# Patient Record
Sex: FEMALE | Race: OTHER | URBAN - METROPOLITAN AREA
[De-identification: names, ages, dates, MRNs, and addresses within clinical notes are randomized per-mention and may not be internally consistent; named-entity substitution may affect disease eponyms.]

---

## 2017-08-01 ENCOUNTER — EMERGENCY (EMERGENCY)
Facility: HOSPITAL | Age: 67
LOS: 1 days | Discharge: HOME | End: 2017-08-01
Attending: EMERGENCY MEDICINE | Admitting: EMERGENCY MEDICINE
Payer: COMMERCIAL

## 2017-08-01 VITALS
DIASTOLIC BLOOD PRESSURE: 78 MMHG | SYSTOLIC BLOOD PRESSURE: 108 MMHG | HEART RATE: 96 BPM | RESPIRATION RATE: 16 BRPM | OXYGEN SATURATION: 99 % | TEMPERATURE: 99 F

## 2017-08-01 VITALS
DIASTOLIC BLOOD PRESSURE: 63 MMHG | RESPIRATION RATE: 16 BRPM | TEMPERATURE: 100 F | HEIGHT: 65 IN | HEART RATE: 92 BPM | OXYGEN SATURATION: 99 % | WEIGHT: 110.01 LBS | SYSTOLIC BLOOD PRESSURE: 113 MMHG

## 2017-08-01 DIAGNOSIS — Z87.891 PERSONAL HISTORY OF NICOTINE DEPENDENCE: ICD-10-CM

## 2017-08-01 DIAGNOSIS — N28.9 DISORDER OF KIDNEY AND URETER, UNSPECIFIED: ICD-10-CM

## 2017-08-01 DIAGNOSIS — R13.10 DYSPHAGIA, UNSPECIFIED: ICD-10-CM

## 2017-08-01 DIAGNOSIS — I10 ESSENTIAL (PRIMARY) HYPERTENSION: ICD-10-CM

## 2017-08-01 DIAGNOSIS — D64.9 ANEMIA, UNSPECIFIED: ICD-10-CM

## 2017-08-01 LAB
ALBUMIN SERPL ELPH-MCNC: 3.5 G/DL — SIGNIFICANT CHANGE UP (ref 3.3–5)
ALP SERPL-CCNC: 71 U/L — SIGNIFICANT CHANGE UP (ref 40–120)
ALT FLD-CCNC: 5 U/L — LOW (ref 10–45)
ANION GAP SERPL CALC-SCNC: 12 MMOL/L — SIGNIFICANT CHANGE UP (ref 5–17)
APTT BLD: 24.8 SEC — LOW (ref 27.5–37.4)
AST SERPL-CCNC: 20 U/L — SIGNIFICANT CHANGE UP (ref 10–40)
BASOPHILS NFR BLD AUTO: 0.7 % — SIGNIFICANT CHANGE UP (ref 0–2)
BILIRUB SERPL-MCNC: 0.3 MG/DL — SIGNIFICANT CHANGE UP (ref 0.2–1.2)
BUN SERPL-MCNC: 14 MG/DL — SIGNIFICANT CHANGE UP (ref 7–23)
CALCIUM SERPL-MCNC: 9.5 MG/DL — SIGNIFICANT CHANGE UP (ref 8.4–10.5)
CHLORIDE SERPL-SCNC: 101 MMOL/L — SIGNIFICANT CHANGE UP (ref 96–108)
CK MB CFR SERPL CALC: 1.3 NG/ML — SIGNIFICANT CHANGE UP (ref 0–6.7)
CK SERPL-CCNC: 50 U/L — SIGNIFICANT CHANGE UP (ref 25–170)
CO2 SERPL-SCNC: 25 MMOL/L — SIGNIFICANT CHANGE UP (ref 22–31)
CREAT SERPL-MCNC: 1.7 MG/DL — HIGH (ref 0.5–1.3)
EOSINOPHIL NFR BLD AUTO: 2.4 % — SIGNIFICANT CHANGE UP (ref 0–6)
GLUCOSE SERPL-MCNC: 96 MG/DL — SIGNIFICANT CHANGE UP (ref 70–99)
HCT VFR BLD CALC: 28.1 % — LOW (ref 34.5–45)
HGB BLD-MCNC: 8.6 G/DL — LOW (ref 11.5–15.5)
INR BLD: 1 — SIGNIFICANT CHANGE UP (ref 0.88–1.16)
LYMPHOCYTES # BLD AUTO: 14.9 % — SIGNIFICANT CHANGE UP (ref 13–44)
MCHC RBC-ENTMCNC: 27.7 PG — SIGNIFICANT CHANGE UP (ref 27–34)
MCHC RBC-ENTMCNC: 30.6 G/DL — LOW (ref 32–36)
MCV RBC AUTO: 90.4 FL — SIGNIFICANT CHANGE UP (ref 80–100)
MONOCYTES NFR BLD AUTO: 10.8 % — SIGNIFICANT CHANGE UP (ref 2–14)
NEUTROPHILS NFR BLD AUTO: 71.2 % — SIGNIFICANT CHANGE UP (ref 43–77)
PLATELET # BLD AUTO: 318 K/UL — SIGNIFICANT CHANGE UP (ref 150–400)
POTASSIUM SERPL-MCNC: 3.9 MMOL/L — SIGNIFICANT CHANGE UP (ref 3.5–5.3)
POTASSIUM SERPL-SCNC: 3.9 MMOL/L — SIGNIFICANT CHANGE UP (ref 3.5–5.3)
PROT SERPL-MCNC: 6.7 G/DL — SIGNIFICANT CHANGE UP (ref 6–8.3)
PROTHROM AB SERPL-ACNC: 11.1 SEC — SIGNIFICANT CHANGE UP (ref 9.8–12.7)
RBC # BLD: 3.11 M/UL — LOW (ref 3.8–5.2)
RBC # FLD: 13.4 % — SIGNIFICANT CHANGE UP (ref 10.3–16.9)
SODIUM SERPL-SCNC: 138 MMOL/L — SIGNIFICANT CHANGE UP (ref 135–145)
TROPONIN T SERPL-MCNC: <0.01 NG/ML — SIGNIFICANT CHANGE UP (ref 0–0.01)
WBC # BLD: 3 K/UL — LOW (ref 3.8–10.5)
WBC # FLD AUTO: 3 K/UL — LOW (ref 3.8–10.5)

## 2017-08-01 PROCEDURE — 93010 ELECTROCARDIOGRAM REPORT: CPT

## 2017-08-01 PROCEDURE — 82550 ASSAY OF CK (CPK): CPT

## 2017-08-01 PROCEDURE — 80053 COMPREHEN METABOLIC PANEL: CPT

## 2017-08-01 PROCEDURE — 84484 ASSAY OF TROPONIN QUANT: CPT

## 2017-08-01 PROCEDURE — 71020: CPT | Mod: 26

## 2017-08-01 PROCEDURE — 85730 THROMBOPLASTIN TIME PARTIAL: CPT

## 2017-08-01 PROCEDURE — 85610 PROTHROMBIN TIME: CPT

## 2017-08-01 PROCEDURE — 99283 EMERGENCY DEPT VISIT LOW MDM: CPT | Mod: 25

## 2017-08-01 PROCEDURE — 85025 COMPLETE CBC W/AUTO DIFF WBC: CPT

## 2017-08-01 PROCEDURE — 82553 CREATINE MB FRACTION: CPT

## 2017-08-01 PROCEDURE — 36415 COLL VENOUS BLD VENIPUNCTURE: CPT

## 2017-08-01 PROCEDURE — 93005 ELECTROCARDIOGRAM TRACING: CPT

## 2017-08-01 PROCEDURE — 71046 X-RAY EXAM CHEST 2 VIEWS: CPT

## 2017-08-01 PROCEDURE — 99285 EMERGENCY DEPT VISIT HI MDM: CPT | Mod: 25

## 2017-08-01 RX ORDER — LISINOPRIL 2.5 MG/1
0 TABLET ORAL
Qty: 0 | Refills: 0 | COMMUNITY

## 2017-08-01 NOTE — ED PROVIDER NOTE - OBJECTIVE STATEMENT
66 yo F w/ h/o HTN and anemia, p/w difficulty swallowing  x 2 months. Pt describes her swallowing as painful and going down slowly, w/ both liquids and solids. Pt notes losing 52 pounds in 3 months despite no change in diet. Pt reports occasionally feeling depression and loneliness at home. Denies any vomiting, abdominal pain, melena, or blooded stool. 66 yo F w/ h/o HTN and anemia, p/w difficulty swallowing  x 2 months. Pt describes her swallowing as painful and going down slowly, w/ both liquids and solids. Pt notes losing 52 pounds in 3 months despite no change in diet. Pt reports occasionally feeling depression and loneliness at home. Denies any focal weakness, numbness, tingling, gait changes, speech changes,  vomiting, abdominal pain, melena, or blooded stool.

## 2017-08-01 NOTE — ED PROVIDER NOTE - CARE PLAN
Principal Discharge DX:	Dysphagia, unspecified type  Secondary Diagnosis:	Renal insufficiency  Secondary Diagnosis:	Anemia, unspecified type

## 2017-08-01 NOTE — ED PROVIDER NOTE - ENMT, MLM
Airway patent, Nasal mucosa clear. Mouth with normal mucosa. Throat has no vesicles, no oropharyngeal exudates and uvula is midline. normal thyroid, normal posterior pharynx w/o legions or masses. tolerating secretions, no stridor, no cervical lymphadenopathy or neck masses.

## 2017-08-01 NOTE — ED ADULT NURSE NOTE - OBJECTIVE STATEMENT
Patient presents to the ED with daughter at bedside, complaining of pain in the midsternal area after swallowing food and water, patient reports that this has been going on for three months. Denies any problems with respirations. Reports pain to be as if someone punched her in the chest. Patient reports increased weight loss in the past 2-3 months, with loss stool. Has been eating normally. Denies any abdominal pain. Bilateral ulcer to the lower legs from vericose veins, getting wound care outpatient. No fever or chills. Lungs sounds clear, equal rise of chest. Hx of HTN.

## 2017-08-01 NOTE — ED PROVIDER NOTE - MEDICAL DECISION MAKING DETAILS
68 yo F, former light smoker, occasional drinker, p/w several months of dysphagia to both solids and liquids in addition to unintentional weight loss. Exam unremarkable. Differential includes malignancy, motility disorder. Do not suspect cardiac cause or stroke. Plan: basic labs to assess nutrition/hydration status, chest xray, and ultimately refer for out pt GI workup.

## 2018-02-09 ENCOUNTER — APPOINTMENT (OUTPATIENT)
Dept: INTERNAL MEDICINE | Facility: CLINIC | Age: 68
End: 2018-02-09

## 2020-02-04 ENCOUNTER — APPOINTMENT (OUTPATIENT)
Dept: NEUROLOGY | Facility: CLINIC | Age: 70
End: 2020-02-04
Payer: MEDICAID

## 2020-02-04 VITALS
DIASTOLIC BLOOD PRESSURE: 82 MMHG | WEIGHT: 136 LBS | TEMPERATURE: 98.6 F | BODY MASS INDEX: 25.03 KG/M2 | HEART RATE: 72 BPM | HEIGHT: 62 IN | SYSTOLIC BLOOD PRESSURE: 148 MMHG | OXYGEN SATURATION: 98 %

## 2020-02-04 DIAGNOSIS — F03.90 UNSPECIFIED DEMENTIA W/OUT BEHAVIORAL DISTURBANCE: ICD-10-CM

## 2020-02-04 DIAGNOSIS — R41.3 OTHER AMNESIA: ICD-10-CM

## 2020-02-04 PROBLEM — I10 ESSENTIAL (PRIMARY) HYPERTENSION: Chronic | Status: ACTIVE | Noted: 2017-08-01

## 2020-02-04 PROCEDURE — 99204 OFFICE O/P NEW MOD 45 MIN: CPT

## 2020-02-04 RX ORDER — APIXABAN 5 MG/1
5 TABLET, FILM COATED ORAL
Refills: 0 | Status: ACTIVE | COMMUNITY

## 2020-02-04 RX ORDER — METOPROLOL SUCCINATE 50 MG/1
50 TABLET, EXTENDED RELEASE ORAL DAILY
Refills: 0 | Status: ACTIVE | COMMUNITY

## 2020-02-04 RX ORDER — LISINOPRIL 20 MG/1
20 TABLET ORAL DAILY
Refills: 0 | Status: ACTIVE | COMMUNITY

## 2020-02-04 NOTE — HISTORY OF PRESENT ILLNESS
[FreeTextEntry1] : CC: memory loss\par \par HPI: 69 year old woman accompanied by her daughter and grandaughter here for evaluation of memory impairment. \par Pt states it started 1.5 years ago, but daughter states it has been longer than that - maybe 2 years or more. \par She repeats questions, doesn't remember what happened recently, forgets where they are going. \par Daughter also states pt was messing up her bills, giving away money etc and so she brought her to live with her a few weeks ago. \par She has a history of atrial fibrillation and hypertension, on eliquis. \par She has not had any blood work or brain imaging for this problem. \par \par ROS: 13 pt review of systems performed and reviewed with patient (General, Eyes, Ears, Cardiovascular, Respiratory, Gastrointestinal, Genitourinary, Musculoskeletal, Skin, Endocrine, Hematologic, Psychiatric, Neurologic)\par Past medical history, surgical history, social history, and family history reviewed with patient\par See scanned document for details

## 2020-02-04 NOTE — ASSESSMENT
[FreeTextEntry1] : She most likely has Alzheimer's type dementia\par Will get CT head to r/o vascular dementia given history of Afib\par Also check B12, folate, RPR, TSH\par Refer for neuropsych testing\par Return after that, will discuss pharmacological options for symptomatic treatment.

## 2020-02-04 NOTE — PHYSICAL EXAM
[FreeTextEntry1] : Gen: appears well, well-nourished, no acute distress\par \par MS: awake, alert, oriented, speech fluent, comprehension intact, good fund of knowledge, recent and remote memory intact, attention intact\par \par MMSE 17/30 (with serial 7s), 18/30 (with WORLD backwards)\par \par CN: PERRL, EOMI, visual fields full, facial strength and sensation intact and symmetric, palate elevation symmetric, tongue midline, no tongue atrophy or fasciculations\par \par Motor: normal bulk and tone, 5/5 strength throughout, no abnormal movements\par \par Sensory: light touch intact and symmetric throughout\par \par Reflexes: 1+ symmetric throughout\par \par Coordination: no dysmetria on finger to nose, Romberg negative\par \par Gait: narrow base, very mildly unstable when turning, no shuffling or freezing \par

## 2020-02-05 LAB
FOLATE SERPL-MCNC: 9 NG/ML
T PALLIDUM AB SER QL IA: NEGATIVE
TSH SERPL-ACNC: 1.13 UIU/ML
VIT B12 SERPL-MCNC: 841 PG/ML

## 2020-02-12 LAB
HOMOCYSTEINE LEVEL: 16.1 UMOL/L
METHYLMALONIC ACID LEVEL: 220 NMOL/L

## 2025-05-06 ENCOUNTER — RX ONLY (RX ONLY)
Age: 75
End: 2025-05-06

## 2025-05-06 ENCOUNTER — OFFICE (OUTPATIENT)
Facility: LOCATION | Age: 75
Setting detail: OPHTHALMOLOGY
End: 2025-05-06
Payer: COMMERCIAL

## 2025-05-06 DIAGNOSIS — H16.221: ICD-10-CM

## 2025-05-06 DIAGNOSIS — H40.51X3: ICD-10-CM

## 2025-05-06 PROBLEM — H40.051 OCULAR HYPERTENSION; RIGHT EYE: Status: ACTIVE | Noted: 2025-05-06

## 2025-05-06 PROCEDURE — 92004 COMPRE OPH EXAM NEW PT 1/>: CPT | Performed by: OPHTHALMOLOGY

## 2025-05-06 PROCEDURE — 92020 GONIOSCOPY: CPT | Performed by: OPHTHALMOLOGY

## 2025-05-06 PROCEDURE — 92250 FUNDUS PHOTOGRAPHY W/I&R: CPT | Performed by: OPHTHALMOLOGY

## 2025-05-06 RX ORDER — BRIMONIDINE TARTRATE 2 MG/ML
SOLUTION OPHTHALMIC
Qty: 3 | Refills: 3 | Status: ACTIVE | OUTPATIENT

## 2025-05-06 RX ORDER — LATANOPROST 50 UG/ML
SOLUTION OPHTHALMIC
Qty: 3 | Refills: 3 | Status: ACTIVE | OUTPATIENT

## 2025-05-06 RX ORDER — ACETAZOLAMIDE 500 MG/1
CAPSULE, EXTENDED RELEASE ORAL
Qty: 28 | Refills: 0 | Status: ACTIVE | OUTPATIENT

## 2025-05-06 RX ORDER — SODIUM CHLORIDE 50 MG/ML
SOLUTION OPHTHALMIC
Qty: 1 | Refills: 0 | Status: ACTIVE | OUTPATIENT

## 2025-05-06 ASSESSMENT — KERATOMETRY
OD_K1POWER_DIOPTERS: 45.25
OD_AXISANGLE_DEGREES: 100
OS_K2POWER_DIOPTERS: 44.75
OS_K1POWER_DIOPTERS: 46.50
OD_K2POWER_DIOPTERS: 47.25
OS_AXISANGLE_DEGREES: 100

## 2025-05-06 ASSESSMENT — REFRACTION_CURRENTRX
OD_AXIS: 018
OD_SPHERE: -4.00
OS_OVR_VA: 20/
OD_ADD: +2.50
OD_CYLINDER: -1.50
OS_AXIS: 100
OS_CYLINDER: -1.25
OS_ADD: +2.00
OS_SPHERE: -4.50
OD_OVR_VA: 20/

## 2025-05-06 ASSESSMENT — CORNEAL EDEMA CLINICAL DESCRIPTION: OD_CORNEALEDEMA: 2+

## 2025-05-06 ASSESSMENT — CONFRONTATIONAL VISUAL FIELD TEST (CVF)
OD_FINDINGS: CONSTRICTION
OS_FINDINGS: FULL

## 2025-05-06 ASSESSMENT — VISUAL ACUITY
OS_BCVA: 20/NLP
OD_BCVA: 20/125-1

## 2025-05-06 ASSESSMENT — SUPERFICIAL PUNCTATE KERATITIS (SPK): OD_SPK: 2+

## 2025-05-06 ASSESSMENT — TONOMETRY: OS_IOP_MMHG: 18

## 2025-05-06 ASSESSMENT — TEAR BREAK UP TIME (TBUT)
OS_TBUT: 1+
OD_TBUT: 1+

## 2025-05-11 ENCOUNTER — EMERGENCY (EMERGENCY)
Facility: HOSPITAL | Age: 75
LOS: 1 days | End: 2025-05-11
Attending: STUDENT IN AN ORGANIZED HEALTH CARE EDUCATION/TRAINING PROGRAM | Admitting: STUDENT IN AN ORGANIZED HEALTH CARE EDUCATION/TRAINING PROGRAM
Payer: COMMERCIAL

## 2025-05-11 VITALS
TEMPERATURE: 98 F | DIASTOLIC BLOOD PRESSURE: 69 MMHG | RESPIRATION RATE: 18 BRPM | HEART RATE: 62 BPM | SYSTOLIC BLOOD PRESSURE: 141 MMHG | OXYGEN SATURATION: 97 %

## 2025-05-11 VITALS
HEART RATE: 84 BPM | RESPIRATION RATE: 18 BRPM | OXYGEN SATURATION: 99 % | TEMPERATURE: 99 F | DIASTOLIC BLOOD PRESSURE: 90 MMHG | SYSTOLIC BLOOD PRESSURE: 135 MMHG | WEIGHT: 123.02 LBS

## 2025-05-11 LAB
ANION GAP SERPL CALC-SCNC: 10 MMOL/L — SIGNIFICANT CHANGE UP (ref 5–17)
ANION GAP SERPL CALC-SCNC: 11 MMOL/L — SIGNIFICANT CHANGE UP (ref 5–17)
BASOPHILS # BLD AUTO: 0.04 K/UL — SIGNIFICANT CHANGE UP (ref 0–0.2)
BASOPHILS NFR BLD AUTO: 1.3 % — SIGNIFICANT CHANGE UP (ref 0–2)
BUN SERPL-MCNC: 16 MG/DL — SIGNIFICANT CHANGE UP (ref 7–23)
BUN SERPL-MCNC: 17 MG/DL — SIGNIFICANT CHANGE UP (ref 7–23)
CALCIUM SERPL-MCNC: 10 MG/DL — SIGNIFICANT CHANGE UP (ref 8.4–10.5)
CALCIUM SERPL-MCNC: 10.2 MG/DL — SIGNIFICANT CHANGE UP (ref 8.4–10.5)
CHLORIDE SERPL-SCNC: 109 MMOL/L — HIGH (ref 96–108)
CHLORIDE SERPL-SCNC: 110 MMOL/L — HIGH (ref 96–108)
CO2 SERPL-SCNC: 18 MMOL/L — LOW (ref 22–31)
CO2 SERPL-SCNC: 19 MMOL/L — LOW (ref 22–31)
CREAT SERPL-MCNC: 0.9 MG/DL — SIGNIFICANT CHANGE UP (ref 0.5–1.3)
CREAT SERPL-MCNC: 0.95 MG/DL — SIGNIFICANT CHANGE UP (ref 0.5–1.3)
EGFR: 62 ML/MIN/1.73M2 — SIGNIFICANT CHANGE UP
EGFR: 62 ML/MIN/1.73M2 — SIGNIFICANT CHANGE UP
EGFR: 66 ML/MIN/1.73M2 — SIGNIFICANT CHANGE UP
EGFR: 66 ML/MIN/1.73M2 — SIGNIFICANT CHANGE UP
EOSINOPHIL # BLD AUTO: 0.15 K/UL — SIGNIFICANT CHANGE UP (ref 0–0.5)
EOSINOPHIL NFR BLD AUTO: 5 % — SIGNIFICANT CHANGE UP (ref 0–6)
GLUCOSE SERPL-MCNC: 109 MG/DL — HIGH (ref 70–99)
GLUCOSE SERPL-MCNC: 114 MG/DL — HIGH (ref 70–99)
HCT VFR BLD CALC: 30 % — LOW (ref 34.5–45)
HGB BLD-MCNC: 8.3 G/DL — LOW (ref 11.5–15.5)
IMM GRANULOCYTES NFR BLD AUTO: 0 % — SIGNIFICANT CHANGE UP (ref 0–0.9)
LACTATE SERPL-SCNC: 1.6 MMOL/L — SIGNIFICANT CHANGE UP (ref 0.5–2)
LYMPHOCYTES # BLD AUTO: 0.62 K/UL — LOW (ref 1–3.3)
LYMPHOCYTES # BLD AUTO: 20.5 % — SIGNIFICANT CHANGE UP (ref 13–44)
MCHC RBC-ENTMCNC: 22.7 PG — LOW (ref 27–34)
MCHC RBC-ENTMCNC: 27.7 G/DL — LOW (ref 32–36)
MCV RBC AUTO: 82.2 FL — SIGNIFICANT CHANGE UP (ref 80–100)
MONOCYTES # BLD AUTO: 0.31 K/UL — SIGNIFICANT CHANGE UP (ref 0–0.9)
MONOCYTES NFR BLD AUTO: 10.3 % — SIGNIFICANT CHANGE UP (ref 2–14)
NEUTROPHILS # BLD AUTO: 1.9 K/UL — SIGNIFICANT CHANGE UP (ref 1.8–7.4)
NEUTROPHILS NFR BLD AUTO: 62.9 % — SIGNIFICANT CHANGE UP (ref 43–77)
NRBC BLD AUTO-RTO: 0 /100 WBCS — SIGNIFICANT CHANGE UP (ref 0–0)
PLATELET # BLD AUTO: 133 K/UL — LOW (ref 150–400)
POTASSIUM SERPL-MCNC: 3.7 MMOL/L — SIGNIFICANT CHANGE UP (ref 3.5–5.3)
POTASSIUM SERPL-MCNC: SIGNIFICANT CHANGE UP (ref 3.5–5.3)
POTASSIUM SERPL-SCNC: 3.7 MMOL/L — SIGNIFICANT CHANGE UP (ref 3.5–5.3)
POTASSIUM SERPL-SCNC: SIGNIFICANT CHANGE UP (ref 3.5–5.3)
RBC # BLD: 3.65 M/UL — LOW (ref 3.8–5.2)
RBC # FLD: 17 % — HIGH (ref 10.3–14.5)
SODIUM SERPL-SCNC: 138 MMOL/L — SIGNIFICANT CHANGE UP (ref 135–145)
SODIUM SERPL-SCNC: 139 MMOL/L — SIGNIFICANT CHANGE UP (ref 135–145)
WBC # BLD: 3.02 K/UL — LOW (ref 3.8–10.5)
WBC # FLD AUTO: 3.02 K/UL — LOW (ref 3.8–10.5)

## 2025-05-11 PROCEDURE — 96374 THER/PROPH/DIAG INJ IV PUSH: CPT

## 2025-05-11 PROCEDURE — 83605 ASSAY OF LACTIC ACID: CPT

## 2025-05-11 PROCEDURE — 87040 BLOOD CULTURE FOR BACTERIA: CPT

## 2025-05-11 PROCEDURE — 99284 EMERGENCY DEPT VISIT MOD MDM: CPT | Mod: 25

## 2025-05-11 PROCEDURE — 80048 BASIC METABOLIC PNL TOTAL CA: CPT

## 2025-05-11 PROCEDURE — 36415 COLL VENOUS BLD VENIPUNCTURE: CPT

## 2025-05-11 PROCEDURE — 99285 EMERGENCY DEPT VISIT HI MDM: CPT

## 2025-05-11 PROCEDURE — 85025 COMPLETE CBC W/AUTO DIFF WBC: CPT

## 2025-05-11 RX ORDER — CEFPODOXIME PROXETIL 200 MG/1
1 TABLET, FILM COATED ORAL
Qty: 14 | Refills: 0
Start: 2025-05-11 | End: 2025-05-17

## 2025-05-11 RX ORDER — CEFTRIAXONE 500 MG/1
1000 INJECTION, POWDER, FOR SOLUTION INTRAMUSCULAR; INTRAVENOUS ONCE
Refills: 0 | Status: COMPLETED | OUTPATIENT
Start: 2025-05-11 | End: 2025-05-11

## 2025-05-11 RX ADMIN — Medication 1000 MILLILITER(S): at 17:57

## 2025-05-11 RX ADMIN — CEFTRIAXONE 100 MILLIGRAM(S): 500 INJECTION, POWDER, FOR SOLUTION INTRAMUSCULAR; INTRAVENOUS at 17:56

## 2025-05-11 NOTE — ED ADULT TRIAGE NOTE - CHIEF COMPLAINT QUOTE
+ confirmed UTI yesterday, declined admission at OSH  Daughter wants to review the results of a CT head that results post dc home  Baseline AMS with vascular dementia, htn, afib on eliquis

## 2025-05-11 NOTE — ED PROVIDER NOTE - ATTENDING APP SHARED VISIT CONTRIBUTION OF CARE
75 year old F presenting with confusion, generalized weakness, low grade fever. Was seen at OSH, diagnosed with UTI, family declined admission, here with worsening symptoms. Will check labs, ua, admit. 75 year old F presenting with confusion, generalized weakness, low grade fever. Was seen at OSH, diagnosed with UTI, family declined admission, here with worsening symptoms. Will check labs, ua, ?admit vs dc.

## 2025-05-11 NOTE — ED ADULT NURSE NOTE - OBJECTIVE STATEMENT
75 F / hx of [facility] , presents to ED with her daughter c/o   increased gen fatigue . Per daughter was seen at other Hospital yesterday and diagnosed for UTI but  daughter signed her out , refusing admission . Patient is  on Amoxycillin  po that was started this morning . Patient is on baseline mental status . Afebrile in triage .

## 2025-05-11 NOTE — ED PROVIDER NOTE - CLINICAL SUMMARY MEDICAL DECISION MAKING FREE TEXT BOX
75 F pmh dementia, htn, afib on AC here with daughter for generalized weakness/fatigue x one week.  seen at OSH yesterday- had labs, ct head and diagnosed with UTI; family declined admission, dc w/ amox.  on exam vss, afebrile, 99.8 rectal temp, normotensive, fatigued appearing but in nad, mmm, lungs ctab, hrrr, abd soft/nt, no cvat, moving all ext, a/ox2 (baseline, states 1950).  results from yesterday reviewed; chronic mild hydrocephalus on CT, no acute pathology.  labs grossly unremarkable and UA + nitrite/LE and wbcs- cult was sent.  suspect sxs 2/2 uti.  will check labs/urine and give ceftriaxone/ivf.      labs reassuring, neg lactate/no leukocytosis.  hemolyzed K but normal Cr.  did not provide UA here but + uti from OSH rst w/ cult sent.  offered admission but pt/daughter would prefer to go home and trial PO abx.  daughter was most c/f CT results but feels comfortable taking mother  home if just uti.  will change to cefpodoxime and discussed strict return parameters

## 2025-05-11 NOTE — ED PROVIDER NOTE - PATIENT PORTAL LINK FT
You can access the FollowMyHealth Patient Portal offered by [facility] by registering at the following website: [email or web address]. By joining [employer]’s FollowMyHealth portal, you will also be able to view your health information using other applications (apps) compatible with our system.

## 2025-05-11 NOTE — ED PROVIDER NOTE - OBJECTIVE STATEMENT
75 F pmh dementia, htn, afib on AC here with daughter for generalized weakness/fatigue x one week.  daughter reports she has been sleeping a lot and very weak.  seen at OSH yesterday- had labs, ct head and diagnosed with UTI.  recommended admission but family declined and AMA prior to CT result, dc w/ amoxicillin.  daughter got CT result today and wasnt sure if it was concerning so came to ED to have mother checked again.  denies f/c, HA, dizziness, chest pain, sob, abd pain, nvd, flank pain, hematuria, trauma
yes

## 2025-05-11 NOTE — ED PROVIDER NOTE - PHYSICAL EXAMINATION
Vitals reviewed  Gen: fatigued appearing, nad, speaking in full sentences  Skin: wwp, no rash/lesions  HEENT: ncat, eomi, mmm, airway patent   CV: rrr, no audible m/r/g  Resp: symmetrical expansion, ctab, no w/r/r  Abd: nondistended, soft, nontender, no r/g, no cvat   Ext: FROM throughout, no peripheral edema, no muscle or joint ttp, distal pulses 2+  Neuro: alert/orientedx2 (baseline, states 1950), no focal deficits

## 2025-05-11 NOTE — ED ADULT NURSE NOTE - CINV DISCH TEACH PARTICIP
Writer left Juana a detailed message that Dr. Behrens provided the birth control prescription and she should let us know if she has any issues filling it.  Office number was provided for additional question or concerns.    Patient/Family

## 2025-05-11 NOTE — ED ADULT NURSE NOTE - NSFALLRISKINTERV_ED_ALL_ED

## 2025-05-14 DIAGNOSIS — G91.9 HYDROCEPHALUS, UNSPECIFIED: ICD-10-CM

## 2025-05-14 DIAGNOSIS — I48.91 UNSPECIFIED ATRIAL FIBRILLATION: ICD-10-CM

## 2025-05-14 DIAGNOSIS — N39.0 URINARY TRACT INFECTION, SITE NOT SPECIFIED: ICD-10-CM

## 2025-05-14 DIAGNOSIS — R53.1 WEAKNESS: ICD-10-CM

## 2025-05-14 DIAGNOSIS — I10 ESSENTIAL (PRIMARY) HYPERTENSION: ICD-10-CM

## 2025-05-16 ENCOUNTER — INPATIENT (INPATIENT)
Facility: HOSPITAL | Age: 75
LOS: 0 days | Discharge: HOME | DRG: 71 | End: 2025-05-16
Attending: INTERNAL MEDICINE | Admitting: INTERNAL MEDICINE
Payer: MEDICAID

## 2025-05-16 VITALS
DIASTOLIC BLOOD PRESSURE: 85 MMHG | SYSTOLIC BLOOD PRESSURE: 127 MMHG | HEIGHT: 61 IN | HEART RATE: 78 BPM | RESPIRATION RATE: 18 BRPM | OXYGEN SATURATION: 94 % | TEMPERATURE: 98 F | WEIGHT: 123.02 LBS

## 2025-05-16 VITALS
HEART RATE: 75 BPM | OXYGEN SATURATION: 99 % | RESPIRATION RATE: 18 BRPM | DIASTOLIC BLOOD PRESSURE: 82 MMHG | TEMPERATURE: 98 F | SYSTOLIC BLOOD PRESSURE: 152 MMHG

## 2025-05-16 DIAGNOSIS — I10 ESSENTIAL (PRIMARY) HYPERTENSION: ICD-10-CM

## 2025-05-16 DIAGNOSIS — K21.9 GASTRO-ESOPHAGEAL REFLUX DISEASE WITHOUT ESOPHAGITIS: ICD-10-CM

## 2025-05-16 DIAGNOSIS — N39.0 URINARY TRACT INFECTION, SITE NOT SPECIFIED: ICD-10-CM

## 2025-05-16 DIAGNOSIS — D64.9 ANEMIA, UNSPECIFIED: ICD-10-CM

## 2025-05-16 DIAGNOSIS — H40.9 UNSPECIFIED GLAUCOMA: ICD-10-CM

## 2025-05-16 DIAGNOSIS — Z29.9 ENCOUNTER FOR PROPHYLACTIC MEASURES, UNSPECIFIED: ICD-10-CM

## 2025-05-16 DIAGNOSIS — R53.1 WEAKNESS: ICD-10-CM

## 2025-05-16 DIAGNOSIS — G91.0 COMMUNICATING HYDROCEPHALUS: ICD-10-CM

## 2025-05-16 DIAGNOSIS — G93.41 METABOLIC ENCEPHALOPATHY: ICD-10-CM

## 2025-05-16 DIAGNOSIS — I48.20 CHRONIC ATRIAL FIBRILLATION, UNSPECIFIED: ICD-10-CM

## 2025-05-16 DIAGNOSIS — E78.5 HYPERLIPIDEMIA, UNSPECIFIED: ICD-10-CM

## 2025-05-16 LAB
ANION GAP SERPL CALC-SCNC: 10 MMOL/L — SIGNIFICANT CHANGE UP (ref 5–17)
APPEARANCE UR: CLEAR — SIGNIFICANT CHANGE UP
BASOPHILS # BLD AUTO: 0.04 K/UL — SIGNIFICANT CHANGE UP (ref 0–0.2)
BASOPHILS NFR BLD AUTO: 1.2 % — SIGNIFICANT CHANGE UP (ref 0–2)
BILIRUB UR-MCNC: NEGATIVE — SIGNIFICANT CHANGE UP
BUN SERPL-MCNC: 17 MG/DL — SIGNIFICANT CHANGE UP (ref 7–23)
CALCIUM SERPL-MCNC: 10.2 MG/DL — SIGNIFICANT CHANGE UP (ref 8.4–10.5)
CHLORIDE SERPL-SCNC: 109 MMOL/L — HIGH (ref 96–108)
CO2 SERPL-SCNC: 22 MMOL/L — SIGNIFICANT CHANGE UP (ref 22–31)
COLOR SPEC: YELLOW — SIGNIFICANT CHANGE UP
CREAT SERPL-MCNC: 0.95 MG/DL — SIGNIFICANT CHANGE UP (ref 0.5–1.3)
DIFF PNL FLD: NEGATIVE — SIGNIFICANT CHANGE UP
EGFR: 62 ML/MIN/1.73M2 — SIGNIFICANT CHANGE UP
EGFR: 62 ML/MIN/1.73M2 — SIGNIFICANT CHANGE UP
EOSINOPHIL # BLD AUTO: 0.24 K/UL — SIGNIFICANT CHANGE UP (ref 0–0.5)
EOSINOPHIL NFR BLD AUTO: 7.2 % — HIGH (ref 0–6)
GLUCOSE SERPL-MCNC: 94 MG/DL — SIGNIFICANT CHANGE UP (ref 70–99)
GLUCOSE UR QL: NEGATIVE MG/DL — SIGNIFICANT CHANGE UP
HCT VFR BLD CALC: 27.1 % — LOW (ref 34.5–45)
HGB BLD-MCNC: 7.8 G/DL — LOW (ref 11.5–15.5)
IMM GRANULOCYTES NFR BLD AUTO: 0.6 % — SIGNIFICANT CHANGE UP (ref 0–0.9)
KETONES UR QL: ABNORMAL MG/DL
LEUKOCYTE ESTERASE UR-ACNC: NEGATIVE — SIGNIFICANT CHANGE UP
LYMPHOCYTES # BLD AUTO: 0.54 K/UL — LOW (ref 1–3.3)
LYMPHOCYTES # BLD AUTO: 16.3 % — SIGNIFICANT CHANGE UP (ref 13–44)
MCHC RBC-ENTMCNC: 23.4 PG — LOW (ref 27–34)
MCHC RBC-ENTMCNC: 28.8 G/DL — LOW (ref 32–36)
MCV RBC AUTO: 81.1 FL — SIGNIFICANT CHANGE UP (ref 80–100)
MONOCYTES # BLD AUTO: 0.36 K/UL — SIGNIFICANT CHANGE UP (ref 0–0.9)
MONOCYTES NFR BLD AUTO: 10.8 % — SIGNIFICANT CHANGE UP (ref 2–14)
NEUTROPHILS # BLD AUTO: 2.12 K/UL — SIGNIFICANT CHANGE UP (ref 1.8–7.4)
NEUTROPHILS NFR BLD AUTO: 63.9 % — SIGNIFICANT CHANGE UP (ref 43–77)
NITRITE UR-MCNC: NEGATIVE — SIGNIFICANT CHANGE UP
NRBC BLD AUTO-RTO: 0 /100 WBCS — SIGNIFICANT CHANGE UP (ref 0–0)
PH UR: 6 — SIGNIFICANT CHANGE UP (ref 5–8)
PLATELET # BLD AUTO: 230 K/UL — SIGNIFICANT CHANGE UP (ref 150–400)
POTASSIUM SERPL-MCNC: 3.8 MMOL/L — SIGNIFICANT CHANGE UP (ref 3.5–5.3)
POTASSIUM SERPL-SCNC: 3.8 MMOL/L — SIGNIFICANT CHANGE UP (ref 3.5–5.3)
PROT UR-MCNC: NEGATIVE MG/DL — SIGNIFICANT CHANGE UP
RBC # BLD: 3.34 M/UL — LOW (ref 3.8–5.2)
RBC # FLD: 18 % — HIGH (ref 10.3–14.5)
SODIUM SERPL-SCNC: 141 MMOL/L — SIGNIFICANT CHANGE UP (ref 135–145)
SP GR SPEC: 1.02 — SIGNIFICANT CHANGE UP (ref 1–1.03)
UROBILINOGEN FLD QL: 0.2 MG/DL — SIGNIFICANT CHANGE UP (ref 0.2–1)
WBC # BLD: 3.32 K/UL — LOW (ref 3.8–10.5)
WBC # FLD AUTO: 3.32 K/UL — LOW (ref 3.8–10.5)

## 2025-05-16 PROCEDURE — 80048 BASIC METABOLIC PNL TOTAL CA: CPT

## 2025-05-16 PROCEDURE — 99285 EMERGENCY DEPT VISIT HI MDM: CPT | Mod: 25

## 2025-05-16 PROCEDURE — 96374 THER/PROPH/DIAG INJ IV PUSH: CPT

## 2025-05-16 PROCEDURE — 81003 URINALYSIS AUTO W/O SCOPE: CPT

## 2025-05-16 PROCEDURE — 36415 COLL VENOUS BLD VENIPUNCTURE: CPT

## 2025-05-16 PROCEDURE — 85025 COMPLETE CBC W/AUTO DIFF WBC: CPT

## 2025-05-16 PROCEDURE — 99285 EMERGENCY DEPT VISIT HI MDM: CPT

## 2025-05-16 PROCEDURE — 99223 1ST HOSP IP/OBS HIGH 75: CPT

## 2025-05-16 RX ORDER — SENNA 187 MG
2 TABLET ORAL AT BEDTIME
Refills: 0 | Status: DISCONTINUED | OUTPATIENT
Start: 2025-05-16 | End: 2025-05-16

## 2025-05-16 RX ORDER — DORZOLAMIDE HYDROCHLORIDE AND TIMOLOL MALEATE 20; 5 MG/ML; MG/ML
1 SOLUTION/ DROPS OPHTHALMIC
Refills: 0 | Status: DISCONTINUED | OUTPATIENT
Start: 2025-05-16 | End: 2025-05-16

## 2025-05-16 RX ORDER — SENNA 187 MG
1 TABLET ORAL
Refills: 0 | DISCHARGE

## 2025-05-16 RX ORDER — BRIMONIDINE TARTRATE 1.5 MG/ML
1 SOLUTION/ DROPS OPHTHALMIC THREE TIMES A DAY
Refills: 0 | Status: DISCONTINUED | OUTPATIENT
Start: 2025-05-16 | End: 2025-05-16

## 2025-05-16 RX ORDER — APIXABAN 2.5 MG/1
1 TABLET, FILM COATED ORAL
Refills: 0 | DISCHARGE

## 2025-05-16 RX ORDER — METOPROLOL SUCCINATE 50 MG/1
50 TABLET, EXTENDED RELEASE ORAL EVERY 24 HOURS
Refills: 0 | Status: DISCONTINUED | OUTPATIENT
Start: 2025-05-17 | End: 2025-05-16

## 2025-05-16 RX ORDER — ATORVASTATIN CALCIUM 80 MG/1
1 TABLET, FILM COATED ORAL
Refills: 0 | DISCHARGE

## 2025-05-16 RX ORDER — METOPROLOL SUCCINATE 50 MG/1
1 TABLET, EXTENDED RELEASE ORAL
Refills: 0 | DISCHARGE

## 2025-05-16 RX ORDER — DORZOLAMIDE HYDROCHLORIDE AND TIMOLOL MALEATE 20; 5 MG/ML; MG/ML
1 SOLUTION/ DROPS OPHTHALMIC
Refills: 0 | DISCHARGE

## 2025-05-16 RX ORDER — LATANOPROST PF 0.05 MG/ML
1 SOLUTION/ DROPS OPHTHALMIC
Refills: 0 | DISCHARGE

## 2025-05-16 RX ORDER — APIXABAN 2.5 MG/1
5 TABLET, FILM COATED ORAL EVERY 12 HOURS
Refills: 0 | Status: DISCONTINUED | OUTPATIENT
Start: 2025-05-16 | End: 2025-05-16

## 2025-05-16 RX ORDER — ACETAZOLAMIDE 250 MG/1
0 TABLET ORAL
Refills: 0 | DISCHARGE

## 2025-05-16 RX ORDER — CEFTRIAXONE 500 MG/1
1000 INJECTION, POWDER, FOR SOLUTION INTRAMUSCULAR; INTRAVENOUS ONCE
Refills: 0 | Status: COMPLETED | OUTPATIENT
Start: 2025-05-16 | End: 2025-05-16

## 2025-05-16 RX ORDER — FERROUS SULFATE 137(45) MG
1 TABLET, EXTENDED RELEASE ORAL
Refills: 0 | DISCHARGE

## 2025-05-16 RX ORDER — OMEPRAZOLE 20 MG/1
1 CAPSULE, DELAYED RELEASE ORAL
Refills: 0 | DISCHARGE

## 2025-05-16 RX ORDER — ACETAZOLAMIDE 250 MG/1
1 TABLET ORAL
Refills: 0 | DISCHARGE

## 2025-05-16 RX ORDER — AZELASTINE HYDROCHLORIDE 0.5 MG/ML
1 SOLUTION/ DROPS INTRAOCULAR
Refills: 0 | DISCHARGE

## 2025-05-16 RX ORDER — ATORVASTATIN CALCIUM 80 MG/1
20 TABLET, FILM COATED ORAL AT BEDTIME
Refills: 0 | Status: DISCONTINUED | OUTPATIENT
Start: 2025-05-16 | End: 2025-05-16

## 2025-05-16 RX ORDER — ACETAMINOPHEN 500 MG/5ML
650 LIQUID (ML) ORAL EVERY 6 HOURS
Refills: 0 | Status: DISCONTINUED | OUTPATIENT
Start: 2025-05-16 | End: 2025-05-16

## 2025-05-16 RX ORDER — CEFTRIAXONE 500 MG/1
1000 INJECTION, POWDER, FOR SOLUTION INTRAMUSCULAR; INTRAVENOUS EVERY 24 HOURS
Refills: 0 | Status: DISCONTINUED | OUTPATIENT
Start: 2025-05-17 | End: 2025-05-16

## 2025-05-16 RX ORDER — BRIMONIDINE TARTRATE 1.5 MG/ML
1 SOLUTION/ DROPS OPHTHALMIC
Refills: 0 | DISCHARGE

## 2025-05-16 RX ORDER — LATANOPROST PF 0.05 MG/ML
1 SOLUTION/ DROPS OPHTHALMIC AT BEDTIME
Refills: 0 | Status: DISCONTINUED | OUTPATIENT
Start: 2025-05-16 | End: 2025-05-16

## 2025-05-16 RX ORDER — LOSARTAN POTASSIUM 100 MG/1
1 TABLET, FILM COATED ORAL
Refills: 0 | DISCHARGE

## 2025-05-16 RX ADMIN — CEFTRIAXONE 100 MILLIGRAM(S): 500 INJECTION, POWDER, FOR SOLUTION INTRAMUSCULAR; INTRAVENOUS at 14:54

## 2025-05-16 NOTE — ED PROVIDER NOTE - PROGRESS NOTE DETAILS
pt's daughter prefers to take her home. Has neuro f/u on Monday and prefers to get MRI then. No WBC, chronic anemia. pt's daughter now amenable to admission. UA clear but cultures from last week growing bacteria. Likely  initial UA clear today bc pt been on cefpodoxime.

## 2025-05-16 NOTE — ED ADULT NURSE NOTE - NSFALLHARMRISKINTERV_ED_ALL_ED

## 2025-05-16 NOTE — ED ADULT NURSE REASSESSMENT NOTE - NS ED NURSE REASSESS COMMENT FT1
MD Alfred at bedside, obtained AMA paperwork. IV access removed. Family to take pt home.
RN informed pt's daughter pt has a clean bed upstairs and are waiting for transport to bring pt to room. Pt's daughter requesting to leave AMA, stating "her urine is clean. I want to take her home." RN attempted to contact senior [place] resident via [phone] with no answer. RN to endorse information to night shift RN.
Assumed care from day shift RN. Pt admitted with clean bed but family wants to leave AMA. Spoke with NICO via telephone, they will come down to talk to pt and family.
Pt inadvertently pulled out IV catheter. RN placed new IV catheter. Pt straight catheterized for urine by RN maintaining sterile technique. Urinalysis sent, awaiting results. Pt NAD, resting in bed, daughter at bedside.

## 2025-05-16 NOTE — ED ADULT TRIAGE NOTE - CHIEF COMPLAINT QUOTE
Pt presents to ED here for worsening weakness and confusion pt was seen here on Sunday and was prescribed with abx pt still on it. Pt A&Ox1. EKG in prog. PMHx of vascular dementia, afib, kidney malfunction ,HTN.

## 2025-05-16 NOTE — ED PROVIDER NOTE - PATIENT PORTAL LINK FT
You can access the FollowMyHealth Patient Portal offered by [facility] by registering at the following website: [email or web address]. By joining [employer]’s FollowMyHealth portal, you will also be able to view your health information using other applications (apps) compatible with our system.

## 2025-05-16 NOTE — H&P ADULT - NSHPLABSRESULTS_GEN_ALL_CORE
7.8    3.32  )-----------( 230      ( 16 May 2025 15:01 )             27.1           141  |  109[H]  |  17  ----------------------------<  94  3.8   |  22  |  0.95    Ca    10.2      16 May 2025 15:01                Urinalysis Basic - ( 16 May 2025 17:43 )    Color: Yellow / Appearance: Clear / S.023 / pH: x  Gluc: x / Ketone: x  / Bili: Negative / Urobili: 0.2 mg/dL   Blood: x / Protein: Negative mg/dL / Nitrite: Negative   Leuk Esterase: Negative / RBC: x / WBC x   Sq Epi: x / Non Sq Epi: x / Bacteria: x            Lactate Trend            CAPILLARY BLOOD GLUCOSE            Urinalysis with Rflx Culture (collected 25 @ 17:43)        Culture Results:   No growth at 4 days ( @ 17:50)  Culture Results:   No growth at 4 days ( @ 17:35)

## 2025-05-16 NOTE — CHART NOTE - NSCHARTNOTEFT_GEN_A_CORE
The patient wishes to leave against medical advice.  I have discussed the risks, benefits and alternatives (including the possibility of worsening of disease, pain, permanent disability, and/or death) with the patient and her daughter at bedside. The patient's daughter voices understanding of these risks, benefits, and alternatives and still wishes to sign out against medical advice.  The patient is awake, alert, oriented  x 1, her daughter/surrogate is decision maker and has demonstrated capacity to refuse/direct care.  I have advised the patient that she can and should return immediately should they develop any worse/different/additional symptoms, or if they change their mind and want to continue their care.    I advised patient to continue current course of cefpodoxime and follow up within 5 days with her PCP. I advised them on return precautions. Attending informed.

## 2025-05-16 NOTE — H&P ADULT - PROBLEM SELECTOR PLAN 3
Patient diagnosed with UTI at OSH, discharged on amoxicillin, then switched to cefpodoxime with persistent confusion and weakness. Meets ¼ SIRS criteria for leukopenia. UA negative however likely i/s/o antibiotic use. BCx 5/11 NGTD. S/p 1 dose of CTX.  -Continue CTX 1g qd x 3 days  -Consider renal US if symptoms not improving on CTX  -Obtain collateral as above re: urine cx Patient diagnosed with UTI at OSH, discharged on amoxicillin, then switched to cefpodoxime with persistent confusion and weakness. Meets ¼ SIRS criteria for leukopenia. UA negative however likely i/s/o antibiotic use. BCx 5/11 NGTD. S/p 1 dose of CTX.  OSH records: 5/10 UA with many bacteria, no epithelial cells, large leuk esterase, positive nitrite, 20-50 WBC; ucx growing >100,000cfu E coli, 10-50,000cfu Enterococcus, no sensitivies seen  -Continue CTX 1g qd x 3 days  -Consider renal US if symptoms not improving on CTX  -Obtain collateral re: urine cx sensitivities

## 2025-05-16 NOTE — H&P ADULT - NSHPSOCIALHISTORY_GEN_ALL_CORE
No smoking hx.  Ambulates with walker at home, usually wheelchair-bound outdoors.  Lives with daughter.

## 2025-05-16 NOTE — H&P ADULT - PROBLEM SELECTOR PLAN 1
Patient p/w confusion and weakness more so than baseline, A&Ox1 on arrival. Per daughter, baseline mentation _____. VSS, labs largely wnl aside from anemia and leukopenia (both appear stable). UA neg, however could be i/s/o antibiotic use. Bcx obtained at prior ED visit on 5/11 are NGTD. Ddx includes UTI v. advancing dementia v. less likely seizure or hydrocephalus.   -Continue CTX 1g qd  -Obtain collateral from OSH for urine cx data  -Delirium precautions, fall precautions Patient p/w confusion and weakness more so than baseline, A&Ox1 on arrival. Per daughter, baseline mentation also A&Ox1. VSS, labs largely wnl aside from anemia and leukopenia (both appear stable). UA neg, however could be i/s/o antibiotic use. Bcx obtained at prior ED visit on 5/11 are NGTD. Ddx includes UTI v. advancing dementia v. less likely seizure or hydrocephalus.   -Continue CTX 1g qd  -Delirium precautions, fall precautions  - W/u anemia as below

## 2025-05-16 NOTE — H&P ADULT - HISTORY OF PRESENT ILLNESS
HPI: Patient is a 74yo F with PMH of dementia, HTN, Afib p/w worsening confusion and weakness. Patient was recently seen at OSH ([facility]) on 5/9/25 for similar symptoms, found to have a UTI and recommended for admission for IV abx, however, at the time patient’s daughter had requested trial of PO antibiotics outpatient. Was discharged on amoxicillin. 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 Came to the ED at [facility] on 5/11 for persistent symptoms, was discharged from ED at the time after switching antibiotics to cefpodoxime. Now, patient with increasing fatigue and confusion, was seen by outpatient physician who recommended coming to the ED for an MRI.     In the ED:  Vital Signs: Temp 97.8 F, HR 78, /85, RR 18, SpO2 94% on RA  Labs: significant for WBC 3.32, hgb 7.8, electrolytes wnl, gluc 109, UA grossly negative  Imaging; none  EKG: slow Afib, HR 60s, nonspecific T wave abnormalities  Interventions: ceftriaxone 1g x 1  Consults: none   HPI: Patient is a 74yo F with PMH of vascular dementia, HTN, Afib, HLD, GERD, glaucoma p/w worsening confusion and weakness. Patient was recently seen at OSH ([facility]) on 5/10/25 for similar symptoms, found to have a UTI and recommended for admission for IV abx, however, at the time patient’s daughter had requested trial of PO antibiotics outpatient. Was discharged on amoxicillin. 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 Came to the ED at [facility] on 5/11 for persistent symptoms, was discharged from ED at the time after switching antibiotics to cefpodoxime. Now, patient with increasing fatigue and confusion, was seen by outpatient physician who recommended coming to the ED for an MRI. Per daughter at bedside, patient has been worse for the past week and seemed more fatigued, was leaning to one side while standing, and seemed to be speaking gibberish, which is why she was initially concerned. Now, she states her mother is at her baseline mentation (A&Ox1), however intermittently appears more confused and tired compared to prior. She is compliant with her medications and has good follow up at [facility]. Ambulates with walker at baseline and alternatively uses wheelchair. Incontinent, uses diapers at baseline. Denies recent bleeding, sick contacts, does have a hx of UTIs. Patient reports feeling well, denies urinary symptoms, chest pain, SOB, N/V, fevers/chills.     In the ED:  Vital Signs: Temp 97.8 F, HR 78, /85, RR 18, SpO2 94% on RA  Labs: significant for WBC 3.32, hgb 7.8, electrolytes wnl, gluc 109, UA grossly negative  Imaging; none  EKG: slow Afib, HR 60s, nonspecific T wave abnormalities  Interventions: ceftriaxone 1g x 1  Consults: none

## 2025-05-16 NOTE — H&P ADULT - ASSESSMENT
Patient is a 76yo F with PMH of dementia, HTN, Afib p/w worsening confusion and weakness, likely metabolic encephalopathy i/s/o UTI.   Patient is a 76yo F with PMH of vascular dementia, HTN, Afib, HLD, GERD, glaucoma p/w worsening confusion and weakness, likely metabolic encephalopathy i/s/o UTI.

## 2025-05-16 NOTE — H&P ADULT - PROBLEM SELECTOR PLAN 6
Hx of Afib, EKG with rate controlled Afib. Home meds:  - No active interventions Hx of Afib, EKG with rate controlled Afib. Home meds:  -Continue home meds

## 2025-05-16 NOTE — H&P ADULT - PROBLEM SELECTOR PLAN 11
F: none  E; replete as needed  N: regular diet, pending bedside dysphagia  DVT ppx: eliquis  Dispo: [facility]

## 2025-05-16 NOTE — ED PROVIDER NOTE - NSFOLLOWUPINSTRUCTIONS_ED_ALL_ED_FT
Follow up with neurologist on Monday for MRI. continue antibiotics. Return to ER for worsening confusion, fevers, fatigue, syncope.

## 2025-05-16 NOTE — H&P ADULT - ATTENDING COMMENTS
76yo f w pmh vascular dementia, hydrocephalus, htn, hld, afib, gerd, glaucoma, w recent er visits for uti, p/w generalized weakness/fatigue/lethargy, confusion; in er, found to be encephalopathic; suspect iso uti; admit to medicine for further mgmt.    Uti  Mild leukopenia; Afebrile, hds otherwise  Current Ua shows bland sediment, likely confounded by recent abx courses; records provided by family show recent uc from osh growing e.coli and enterococcus, pending final c+s    s/p Rocephin in er  follow up blood and urine cultures   monitor for fever, changes in white count  obtain osh records of uc’s final c+s   cont empirical ceftriaxone for now     encephalopathy  h/o vascular dementia  a+o x1 at baseline  recent neuroimaging as described above  likely iso uti +/- dehydration 2/2 poor po intake  fu tsh, folate, b12  Monitor mental status with frequent neurochecks  maintain fall, delirium, aspiration precautions; keep head end of bed elevated  encourage po intake; ivf + lytes as needed in the meantime  pt/ot eval + sw/cm consult for disposition    Otherwise, concur w a+p as described by resident above

## 2025-05-16 NOTE — H&P ADULT - PROBLEM SELECTOR PLAN 5
Patient with hgb 7.8 on presentation, appears to be at baseline. No s/s of active bleeding.   -Check iron studies  -Maintain active T&S  -Consider transfusion if hgb < 7 Patient with hgb 7.8 on presentation, appears to be at baseline. No s/s of active bleeding. On iron pills at home, likely TONY.   -Check iron studies  -Maintain active T&S  -Consider transfusion if hgb < 7  -Consider IV iron if indicated

## 2025-05-16 NOTE — ED PROVIDER NOTE - OBJECTIVE STATEMENT
75 F pmh dementia, htn, afib on AC here for worsening weakness/confusion. Pt dx'd with UTI at OSH on Friday, they recommended admission but daughter preferred to try PO abx first so started on amoxicillin. CT that day showing communicating hydrocephalus. Came to ED here next day for persistent fatigue/confusion and to discuss CT results. Preferred to go home so switched to cefpodoxime. Sent in today by  for communicating hydrocephalus for MRI and pt with persistent fatigue and confusion compared to baseline.

## 2025-05-16 NOTE — ED PROVIDER NOTE - CLINICAL SUMMARY MEDICAL DECISION MAKING FREE TEXT BOX
75 F pmh dementia, htn, afib on AC here for worsening weakness/confusion. Pt dx'd with UTI at OSH on Friday, they recommended admission but daughter preferred to try PO abx first so started on amoxicillin. CT that day showing communicating hydrocephalus. Came to ED here next day for persistent fatigue/confusion and to discuss CT results. Preferred to go home so switched to cefpodoxime. Sent in today by  for communicating hydrocephalus for MRI and pt with persistent fatigue and confusion compared to baseline.     Likely 2/2 UTI. Plan for admission this time for IV abx.   Advised daughter that CTH in February also showed communicating hydrocephalus 75 F pmh dementia, htn, afib on AC here for worsening weakness/confusion. Pt dx'd with UTI at OSH on Friday, they recommended admission but daughter preferred to try PO abx first so started on amoxicillin. CT that day showing communicating hydrocephalus. Came to ED here next day for persistent fatigue/confusion and to discuss CT results. Preferred to go home so switched to cefpodoxime. Sent in today by  for communicating hydrocephalus for MRI and pt with persistent fatigue and confusion compared to baseline.     Likely 2/2 UTI. Plan for admission this time for IV abx.   Advised daughter that CTH in February also showed communicating hydrocephalus and that does not require urgent MRI or shunt eval.

## 2025-05-16 NOTE — H&P ADULT - PROBLEM SELECTOR PLAN 7
F: none  E; replete as needed  N: regular diet, pending bedside dysphagia  DVT ppx:   Dispo: RMF Well controlled. Home meds: Losartan 25 mg qd.  -Continue home med if hypertensive, currently well controlled

## 2025-05-16 NOTE — H&P ADULT - PROBLEM SELECTOR PLAN 2
Patient with generalized weakness, most likely i/s/o UTI v. anemia v. less likely gait instability given hydrocephalus.   f/u TSH, B12  -Anemia w/u as below  -PT consulted, appreciate recs

## 2025-05-16 NOTE — H&P ADULT - PROBLEM SELECTOR PLAN 4
Patient noted at OSH on CTH to have a communicating hydrocephalus, discharged with outpatient neurology follow up. Unlikely to be contributing to patient's current symptoms.   -Further workup outpatient with neurologist Patient noted at OSH on CTH to have a communicating hydrocephalus, discharged with outpatient neurology follow up. Appears stable when compared to OSH MRI from 2018. Patient does have dementia, wobbly gait, urinary incontinence but this has been her baseline for the past 5+ years. Unlikely to be contributing to patient's current symptoms.   -Further workup outpatient with neurologist

## 2025-05-16 NOTE — H&P ADULT - NSHPPHYSICALEXAM_GEN_ALL_CORE
.  VITAL SIGNS:  T(C): 36.6 (05-16-25 @ 18:23), Max: 36.6 (05-16-25 @ 14:25)  T(F): 97.8 (05-16-25 @ 18:23), Max: 97.8 (05-16-25 @ 14:25)  HR: 71 (05-16-25 @ 18:23) (71 - 78)  BP: 146/75 (05-16-25 @ 18:23) (127/85 - 146/75)  BP(mean): --  RR: 18 (05-16-25 @ 18:23) (18 - 18)  SpO2: 100% (05-16-25 @ 18:23) (94% - 100%)  Wt(kg): --    PHYSICAL EXAM:    Constitutional: resting comfortably in bed; NAD  Head: NC/AT  Eyes: PERRL, EOMI, anicteric sclera  ENT: no nasal discharge; uvula midline, no oropharyngeal erythema or exudates; MMM  Neck: supple; no JVD or thyromegaly  Respiratory: CTA B/L; no W/R/R, no retractions  Cardiac: +S1/S2; RRR; no M/R/G; PMI non-displaced  Gastrointestinal: abdomen soft, NT/ND; no rebound or guarding; +BSx4  Back: spine midline, no bony tenderness or step-offs; no CVAT B/L  Extremities: WWP, no clubbing or cyanosis; no peripheral edema  Musculoskeletal: NROM x4; no joint swelling, tenderness or erythema  Vascular: 2+ radial, DP/PT pulses B/L  Dermatologic: skin warm, dry and intact; no rashes, wounds, or scars  Lymphatic: no submandibular or cervical LAD  Neurologic: AAOx3; CNII-XII grossly intact; no focal deficits  Psychiatric: affect and characteristics of appearance, verbalizations, behaviors are appropriate .  VITAL SIGNS:  T(C): 36.6 (05-16-25 @ 18:23), Max: 36.6 (05-16-25 @ 14:25)  T(F): 97.8 (05-16-25 @ 18:23), Max: 97.8 (05-16-25 @ 14:25)  HR: 71 (05-16-25 @ 18:23) (71 - 78)  BP: 146/75 (05-16-25 @ 18:23) (127/85 - 146/75)  BP(mean): --  RR: 18 (05-16-25 @ 18:23) (18 - 18)  SpO2: 100% (05-16-25 @ 18:23) (94% - 100%)  Wt(kg): --    PHYSICAL EXAM:    Constitutional: resting comfortably in bed; NAD  Head: NC/AT  Eyes: PERRL, EOMI, anicteric sclera  ENT: no nasal discharge; dry MM  Neck: supple; no JVD or thyromegaly  Respiratory: CTA B/L; no W/R/R  Cardiac: +S1/S2; floyd, irregular; no M/R/G  Gastrointestinal: abdomen soft, NT/ND; no rebound or guarding; +BSx4; no suprapubic tenderness  Extremities: WWP, no clubbing or cyanosis; no peripheral edema  Musculoskeletal: NROM x4; no joint swelling, tenderness or erythema  Vascular: 2+ radial, DP/PT pulses B/L  Dermatologic: skin warm, dry and intact; no rashes, wounds, or scars  Neurologic: AAOx1 (to self, baseline); CNII-XII grossly intact; no focal deficits

## 2025-05-16 NOTE — H&P ADULT - PROBLEM SELECTOR PLAN 10
Recently cristina, started on eye drops and acetazolamide. Took one dose of Acetazolamide last week and proceeded to seem dizzy, so daughter has not continued this medication.   -Continue eye drops

## 2025-05-16 NOTE — ED ADULT NURSE NOTE - OBJECTIVE STATEMENT
75 y.o female A&Ox2 (disoriented to time, situation) PMHx dementia, HTN, aFib (on AC) presents to ED w/ daughter w/ c/o weakness/confusion x several days. Pt seen at OSH and [facility] ED recently treated for UTI (see provider note). Pt sent to [facility] ED today by pt's MD for potential MRI for hydrocephalus shown on prior CT. Pt NAD, denies pain, SOB, chest pain at time of assessment. Pt placed in yellow gown.

## 2025-05-17 LAB
CULTURE RESULTS: SIGNIFICANT CHANGE UP
CULTURE RESULTS: SIGNIFICANT CHANGE UP
SPECIMEN SOURCE: SIGNIFICANT CHANGE UP
SPECIMEN SOURCE: SIGNIFICANT CHANGE UP

## 2025-05-19 ENCOUNTER — APPOINTMENT (OUTPATIENT)
Dept: OPHTHALMOLOGY | Facility: CLINIC | Age: 75
End: 2025-05-19
Payer: MEDICAID

## 2025-05-19 ENCOUNTER — NON-APPOINTMENT (OUTPATIENT)
Age: 75
End: 2025-05-19

## 2025-05-19 PROCEDURE — 92133 CPTRZD OPH DX IMG PST SGM ON: CPT

## 2025-05-19 PROCEDURE — 76514 ECHO EXAM OF EYE THICKNESS: CPT

## 2025-05-19 PROCEDURE — 92004 COMPRE OPH EXAM NEW PT 1/>: CPT | Mod: 25

## 2025-05-20 ENCOUNTER — APPOINTMENT (OUTPATIENT)
Dept: HEART AND VASCULAR | Facility: CLINIC | Age: 75
End: 2025-05-20

## 2025-05-22 DIAGNOSIS — N39.0 URINARY TRACT INFECTION, SITE NOT SPECIFIED: ICD-10-CM

## 2025-05-22 DIAGNOSIS — F01.50 VASCULAR DEMENTIA, UNSPECIFIED SEVERITY, WITHOUT BEHAVIORAL DISTURBANCE, PSYCHOTIC DISTURBANCE, MOOD DISTURBANCE, AND ANXIETY: ICD-10-CM

## 2025-05-22 DIAGNOSIS — E78.5 HYPERLIPIDEMIA, UNSPECIFIED: ICD-10-CM

## 2025-05-22 DIAGNOSIS — I48.20 CHRONIC ATRIAL FIBRILLATION, UNSPECIFIED: ICD-10-CM

## 2025-05-22 DIAGNOSIS — D50.9 IRON DEFICIENCY ANEMIA, UNSPECIFIED: ICD-10-CM

## 2025-05-22 DIAGNOSIS — G91.0 COMMUNICATING HYDROCEPHALUS: ICD-10-CM

## 2025-05-22 DIAGNOSIS — Z53.29 PROCEDURE AND TREATMENT NOT CARRIED OUT BECAUSE OF PATIENT'S DECISION FOR OTHER REASONS: ICD-10-CM

## 2025-05-22 DIAGNOSIS — E86.0 DEHYDRATION: ICD-10-CM

## 2025-05-22 DIAGNOSIS — G93.41 METABOLIC ENCEPHALOPATHY: ICD-10-CM

## 2025-05-22 DIAGNOSIS — H40.9 UNSPECIFIED GLAUCOMA: ICD-10-CM

## 2025-05-22 DIAGNOSIS — I10 ESSENTIAL (PRIMARY) HYPERTENSION: ICD-10-CM

## 2025-05-22 DIAGNOSIS — R53.1 WEAKNESS: ICD-10-CM

## 2025-06-16 ENCOUNTER — APPOINTMENT (OUTPATIENT)
Dept: OPHTHALMOLOGY | Facility: CLINIC | Age: 75
End: 2025-06-16

## 2025-08-26 ENCOUNTER — APPOINTMENT (OUTPATIENT)
Dept: NEUROLOGY | Age: 75
End: 2025-08-26